# Patient Record
Sex: MALE | Race: WHITE
[De-identification: names, ages, dates, MRNs, and addresses within clinical notes are randomized per-mention and may not be internally consistent; named-entity substitution may affect disease eponyms.]

---

## 2018-03-21 ENCOUNTER — HOSPITAL ENCOUNTER (EMERGENCY)
Dept: HOSPITAL 65 - ER | Age: 83
Discharge: HOME | End: 2018-03-21
Payer: MEDICARE

## 2018-03-21 VITALS — WEIGHT: 226 LBS | BODY MASS INDEX: 34.25 KG/M2 | HEIGHT: 68 IN

## 2018-03-21 VITALS — DIASTOLIC BLOOD PRESSURE: 100 MMHG | SYSTOLIC BLOOD PRESSURE: 175 MMHG

## 2018-03-21 VITALS — SYSTOLIC BLOOD PRESSURE: 175 MMHG | DIASTOLIC BLOOD PRESSURE: 100 MMHG

## 2018-03-21 DIAGNOSIS — J06.9: Primary | ICD-10-CM

## 2018-03-21 DIAGNOSIS — I50.9: ICD-10-CM

## 2018-03-21 DIAGNOSIS — I11.0: ICD-10-CM

## 2018-03-21 LAB
ALP INTEST CFR SERPL: 76 U/L (ref 50–136)
ALT SERPL-CCNC: 35 U/L (ref 12–78)
AST SERPL-CCNC: 21 U/L (ref 0–35)
BASOPHILS # BLD AUTO: 0 10^3/UL (ref 0–0.1)
BASOPHILS NFR BLD AUTO: 0.6 % (ref 0–0.2)
CALCIUM SERPL-MCNC: 8.7 MG/DL (ref 8.4–10.5)
CO2 BLDA-SCNC: 23.4 MMOL/L (ref 20–32)
EOSINOPHIL # BLD AUTO: 0.4 10^3/UL (ref 0–0.2)
EOSINOPHIL NFR BLD AUTO: 5.8 % (ref 0–5)
ERYTHROCYTE [DISTWIDTH] IN BLOOD BY AUTOMATED COUNT: 13.5 % (ref 11.5–14.5)
GLUCOSE PRE 100 G GLC PO SERPL-MCNC: 122 MG/DL (ref 70–110)
HGB BLD-MCNC: 13.1 G/DL (ref 13.9–16.3)
LYMPHOCYTES # BLD AUTO: 2.1 10^3/UL (ref 1–4.8)
LYMPHOCYTES NFR BLD AUTO: 29.9 % (ref 24–44)
MCH RBC QN AUTO: 29.9 PG (ref 26–34)
MCHC RBC AUTO-ENTMCNC: 32.3 G/DL (ref 33–37)
MCV RBC AUTO: 92.5 FL (ref 78–100)
MONOCYTES # BLD AUTO: 0.7 10^3/UL (ref 0.3–0.8)
MONOCYTES NFR BLD AUTO: 10.3 % (ref 5–12)
NEUTROPHILS # BLD AUTO: 3.8 10^3/UL (ref 1.8–7.7)
NEUTROPHILS NFR BLD AUTO: 53.3 % (ref 41–85)
PLATELET # BLD AUTO: 183 10^3/UL (ref 150–400)
PMV BLD AUTO: 9.8 FL (ref 7.8–11)
WBC # BLD AUTO: 7.1 10^3/UL (ref 4.5–11)

## 2018-03-21 PROCEDURE — 85730 THROMBOPLASTIN TIME PARTIAL: CPT

## 2018-03-21 PROCEDURE — 83880 ASSAY OF NATRIURETIC PEPTIDE: CPT

## 2018-03-21 PROCEDURE — 82553 CREATINE MB FRACTION: CPT

## 2018-03-21 PROCEDURE — 94640 AIRWAY INHALATION TREATMENT: CPT

## 2018-03-21 PROCEDURE — 85025 COMPLETE CBC W/AUTO DIFF WBC: CPT

## 2018-03-21 PROCEDURE — 84484 ASSAY OF TROPONIN QUANT: CPT

## 2018-03-21 PROCEDURE — 36415 COLL VENOUS BLD VENIPUNCTURE: CPT

## 2018-03-21 PROCEDURE — 93005 ELECTROCARDIOGRAM TRACING: CPT

## 2018-03-21 PROCEDURE — 85610 PROTHROMBIN TIME: CPT

## 2018-03-21 PROCEDURE — 99285 EMERGENCY DEPT VISIT HI MDM: CPT

## 2018-03-21 PROCEDURE — 85379 FIBRIN DEGRADATION QUANT: CPT

## 2018-03-21 PROCEDURE — 87040 BLOOD CULTURE FOR BACTERIA: CPT

## 2018-03-21 PROCEDURE — 80053 COMPREHEN METABOLIC PANEL: CPT

## 2018-03-21 PROCEDURE — 82550 ASSAY OF CK (CPK): CPT

## 2018-03-21 PROCEDURE — 86710 INFLUENZA VIRUS ANTIBODY: CPT

## 2018-03-21 PROCEDURE — 71045 X-RAY EXAM CHEST 1 VIEW: CPT

## 2018-03-21 RX ADMIN — IPRATROPIUM BROMIDE AND ALBUTEROL SULFATE STA ML: .5; 2.5 SOLUTION RESPIRATORY (INHALATION) at 17:50

## 2018-03-21 RX ADMIN — DEXAMETHASONE SODIUM PHOSPHATE STA MG: 4 INJECTION, SOLUTION INTRA-ARTICULAR; INTRALESIONAL; INTRAMUSCULAR; INTRAVENOUS; SOFT TISSUE at 17:51

## 2018-03-21 NOTE — ER.PDOC
General


Chief Complaint:  Dyspnea/Respdistress


Stated Complaint:  DIFFICULTY BREATHING


Time seen by MD:  18:41


Source:  patient


Exam Limitations:  no limitations





History of Present Illness


Initial Comments


Difficulty breathing for past few days


Severity:  moderate


Prior Episodes/Possible Cause:  occasional episodes


Associated Symptoms:  cough (occasionally)


Prior symptoms/Treatment:  Similar symptoms previous


Allergies:  


Coded Allergies:  


     No Known Allergies (Unverified , 3/21/18)





Past Medical History


Medical History:  cardiac problems, congestive heart failure, hypertension


Surgical History:  no surgical history





Social History


Smoking:  non-smoker


Alcohol Use:  none


Drug Use:  none





Review of Systems


Constitutional:  no symptoms reported


EENTM:  no symptoms reported


Respiratory:  no symptoms reported, see HPI


Cardiovascular:  no symptoms reported


Gastrointestinal:  no symptoms reported


Genitourinary:  no symptoms reported


All Other Systems:  Reviewed and Negative





Physical Exam


General Appearance:  No Apparent Distress, WD/WN


HEENT:  PERRL/EOMI, Normal ENT Inspection, TMs Normal, Pharynx Normal


Neck:  Non-Tender, Full Range of Motion, Supple, Normal Inspection


Respiratory:  chest non-tender, lungs clear, normal breath sounds, no 

respiratory distress, no accessory muscle use


Cardiovascular:  Normal Peripheral Pulses, Regular Rate, Rhythm, No Edema, No 

Gallop, No JVD, No Murmur


Gastrointestinal:  Normal Bowel Sounds, No Organomegaly, No Pulsatile Mass, Non 

Tender, Soft


Extremities:  Normal Range of Motion, Non-Tender, Normal Inspection, No Pedal 

Edema, No Calf Tenderness, Normal Capillary Refill


Neurologic/Psychiatric:  CNs II-XII NML as Tested, No Motor/Sensory Deficits, 

Alert, Normal Mood/Affect, Oriented x 3


Skin:  Normal Color, Warm/Dry





Results/Orders


Results/Orders





Laboratory Tests








Test


  3/21/18


17:50 3/21/18


18:21


 


White Blood Count


  7.1 10^3/uL


(4.5-11.0) 


 


 


Red Blood Count


  4.38 10^6/uL


(4.50-5.90) 


 


 


Hemoglobin


  13.1 g/dL


(13.9-16.3) 


 


 


Hematocrit


  40.5 %


(37.0-53.0) 


 


 


Mean Corpuscular Volume


  92.5 fL


() 


 


 


Mean Corpuscular Hemoglobin


  29.9 pg


(26-34) 


 


 


Mean Corpuscular Hemoglobin


Concent 32.3 g/dL


(33-37) 


 


 


Red Cell Distribution Width


  13.5 %


(11.5-14.5) 


 


 


Platelet Count


  183 10^3/uL


(150-400) 


 


 


Mean Platelet Volume


  9.8 fL


(7.8-11.0) 


 


 


Neutrophils (%) (Auto)


  53.3 %


(41.0-85.0) 


 


 


Lymphocytes (%) (Auto)


  29.9 %


(24.0-44.0) 


 


 


Monocytes (%) (Auto)


  10.3 %


(5.0-12.0) 


 


 


Neutrophils # (Auto)


  3.8 10^3/uL


(1.8-7.7) 


 


 


Lymphocytes # (Auto)


  2.1 10^3/uL


(1.0-4.8) 


 


 


Monocytes # (Auto)


  0.7 10^3/uL


(0.3-0.8) 


 


 


Absolute Immature Granulocyte


(auto 0.01 10^3 u/L


(0-2) 


 


 


Eosinophils %


  5.8 %


(0.0-5.0) 


 


 


Basophils %


  0.6 %


(0.0-0.2) 


 


 


Basophils #


  0.0 10^3/uL


(0.0-0.1) 


 


 


Eosinophil Count


  0.4 10^3/uL


(0.0-0.2) 


 


 


Prothrombin Time


  9.3 SEC


(9.8-11.9) 


 


 


Prothrombin Time INR


(Non-Therap) 0.9 


  


 


 


Activated Partial


Thromboplast Time 24.2 SEC


(24.67-30.72) 


 


 


D-Dimer


  0.52 mg/L


(0.19-0.49) 


 


 


Sodium Level


  139 mmol/L


(132-145) 


 


 


Potassium Level


  4.1 mmol/L


(3.6-5.2) 


 


 


Chloride Level


  104.0 mmol/L


() 


 


 


Carbon Dioxide Level


  23.4 mmol/L


(20.0-32) 


 


 


Anion Gap 15.7  


 


Blood Urea Nitrogen


  22 mg/dL


(7-18) 


 


 


Creatinine


  1.47 mg/dL


(0.59-1.40) 


 


 


Estimated GFR (


American) 55.8 (>/=60) 


  


 


 


BUN/Creatinine Ratio 14.0  


 


Glucose Level


  122 mg/dL


() 


 


 


Calcium Level


  8.7 mg/dL


(8.4-10.5) 


 


 


Total Bilirubin


  0.4 mg/dL


(0.2-1.0) 


 


 


Aspartate Amino Transf


(AST/SGOT) 21 U/L (0-35) 


  


 


 


Alanine Aminotransferase


(ALT/SGPT) 35 U/L (12-78) 


  


 


 


Alkaline Phosphatase


  76 U/L


() 


 


 


Total Creatine Kinase


  164 U/L


() 


 


 


Creatine Kinase MB


  3.6 ng/mL


(0.5-3.6) 


 


 


Troponin I


  < 0.02 ng/mL


(0.00-0.05) 


 


 


Pro-B-Type Natriuretic Peptide


  362 pg/mL


(0-450) 


 


 


Total Protein


  7.0 g/dL


(6.4-8.2) 


 


 


Albumin


  3.4 g/dL


(3.4-5.0) 


 


 


Globulin 3.6  


 


Percent Immature Gran (Cell


Imm) 0.10 %


(0.00-0.50) 


 


 


Influenza Type A Antigen  NEGATIVE (NEG) 


 


Influenza B Immunofluorescence  NEGATIVE (NEG) 








Administered Medications








 Medications


  (Trade)  Dose


 Ordered  Sig/Tony


 Route


 PRN Reason  Start Time


 Stop Time Status Last Admin


Dose Admin


 


 Albuterol/


 Ipratropium


  (Duoneb 0.5 Mg-3


 Mg/3 ml Soln)  3 ml  STAT  STAT


 [facility]


   3/21/18 17:40


 3/21/18 17:41 DC 3/21/18 17:50


 


 


 Dexamethasone


 Sodium Phosphate


  (Decadron)  4 mg  STAT  STAT


 [facility]


   3/21/18 17:40


 3/21/18 17:41 DC 3/21/18 17:51


 











EKG/XRAY/CT/US


EKG Comments:  RBBB


XRAY:  chest (No active disease)





Departure


Time of Disposition:  20:01


Disposition:  01 HOME, SELF-CARE


Impression:  


 Primary Impression:  


 Acute upper respiratory infection


Condition:  Stable


Referrals:  


PCP,UNKNOWN (PCP)


PRIMARY CARE PROVIDER





Additional Instructions:  


Mucinex DM OTC as directed


F/U with your PCP in 2-3 days


Duration or Time Spent with Pa:  90 mins











PAZ BARNEY MD Mar 21, 2018 18:42

## 2018-03-21 NOTE — DIREP
PROCEDURE:CHEST 1 VIEW

 

COMPARISON:None.

 

INDICATIONS:SOB

 

FINDINGS:

LUNGS/PLEURA:Mild senescent interstitial prominence.  No significant pulmonary 

parenchymal abnormalities. No effusions.

VASCULATURE:Normal.  Unremarkable pulmonary vasculature.

CARDIAC:Normal.  No cardiac silhouette abnormality or cardiomegaly. 

MEDIASTINUM:Ectasia of the thoracic aorta

BONES:Normal.  No fracture or visible bony lesion. 

OTHER:Negative.  

 

CONCLUSION:No active cardiopulmonary disease process

 

 

 

Dictated by: RICKEY Milton M.D. on 03/21/2018 at 07:48 PM     

Electronically Signed By: RICKEY Milton M.D. on 03/21/2018 at 07:48 PM

## 2018-03-21 NOTE — PCM.EKG
[facility]

                                       

Test Date:    2018               Test Time:    18:24:16

Pat Name:     CLAUS BREWER             Department:   

Patient ID:   [facility]-[id number]          Room:          

Gender:       M                        Technician:   ABE

:          [id number]               Requested By: PAZ BARNEY

Order Number: [id number]            Reading MD:   Paz BARNEY

                                 Measurements

Intervals                              Axis          

Rate:         75                       P:            

FL:                                    QRS:          260

QRSD:         146                      T:            11

QT:           402                                    

QTc:          448                                    

                           Interpretive Statements

Wide QRS rhythm

Right bundle branch block

Inferior infarct, age undetermined

Anteroseptal infarct, age undetermined

Abnormal ECG

No previous ECG available for comparison



Electronically Signed On 3- 23:04:56 CDT by Paz BARNEY



Please click the below link to view image of tracing.